# Patient Record
Sex: MALE | Race: WHITE | NOT HISPANIC OR LATINO | Employment: FULL TIME | URBAN - METROPOLITAN AREA
[De-identification: names, ages, dates, MRNs, and addresses within clinical notes are randomized per-mention and may not be internally consistent; named-entity substitution may affect disease eponyms.]

---

## 2021-04-08 DIAGNOSIS — Z23 ENCOUNTER FOR IMMUNIZATION: ICD-10-CM

## 2021-07-19 ENCOUNTER — OFFICE VISIT (OUTPATIENT)
Dept: URGENT CARE | Facility: MEDICAL CENTER | Age: 31
End: 2021-07-19
Payer: COMMERCIAL

## 2021-07-19 VITALS
BODY MASS INDEX: 34.07 KG/M2 | RESPIRATION RATE: 16 BRPM | HEIGHT: 69 IN | WEIGHT: 230 LBS | OXYGEN SATURATION: 98 % | DIASTOLIC BLOOD PRESSURE: 92 MMHG | TEMPERATURE: 99.1 F | HEART RATE: 77 BPM | SYSTOLIC BLOOD PRESSURE: 176 MMHG

## 2021-07-19 DIAGNOSIS — T63.441A BEE STING, ACCIDENTAL OR UNINTENTIONAL, INITIAL ENCOUNTER: Primary | ICD-10-CM

## 2021-07-19 DIAGNOSIS — R03.0 ELEVATED BLOOD PRESSURE READING: ICD-10-CM

## 2021-07-19 PROCEDURE — G0382 LEV 3 HOSP TYPE B ED VISIT: HCPCS | Performed by: PHYSICIAN ASSISTANT

## 2021-07-19 NOTE — PROGRESS NOTES
[facility]        NAME: Kain Davalos is a 31 y o  male  : 1990    MRN: [id number]  DATE: 2021  TIME: 6:17 PM    Assessment and Plan   Bee sting, accidental or unintentional, initial encounter [T63 [id number]]  1  Bee sting, accidental or unintentional, initial encounter  hydrocortisone 2 5 % cream   2  Elevated blood pressure reading           Patient Instructions      apply cream to affected area as directed   Benadryl as directed for itching control  Follow up with PCP in 3-5 days  Proceed to  ER if symptoms worsen  Chief Complaint     Chief Complaint   Patient presents with    Insect Bite     Pt here for bee sting on left flank area  Pt c/o itching at site and redness  Sting occurred yesterday  History of Present Illness        25-year-old male presents with insect sting to the left flank area  Patient reports  [employer] and got stung on his left flank by a bee  Only 1 sting reported  Denies any swelling of lips tongue throat or mouth  No problems with breathing chest pain shortness of breath or wheezing  No nausea vomiting  Denies any fevers or chills  Reports that the areas been very itchy  Has not taking anything for it or applied anything to area  Insect Bite  This is a new problem  The current episode started yesterday  The problem occurs constantly  The problem has been gradually worsening  Associated symptoms include a rash  Pertinent negatives include no change in bowel habit, chills, congestion, coughing, fatigue, fever, headaches, myalgias, nausea, sore throat or vomiting  Nothing aggravates the symptoms  He has tried nothing for the symptoms  The treatment provided no relief  Review of Systems   Review of Systems   Constitutional: Negative  Negative for chills, fatigue and fever  HENT: Negative  Negative for congestion and sore throat  Eyes: Negative  Respiratory: Negative  Negative for cough  Cardiovascular: Negative  Gastrointestinal: Negative  Negative for change in bowel habit, nausea and vomiting  Musculoskeletal: Negative  Negative for myalgias  Skin: Positive for rash  Neurological: Negative  Negative for headaches  Current Medications       Current Outpatient Medications:     hydrocortisone 2 5 % cream, Apply topically 2 (two) times a day, Disp: 30 g, Rfl: 0    Current Allergies     Allergies as of 07/19/2021    (No Known Allergies)            The following portions of the patient's history were reviewed and updated as appropriate: allergies, current medications, past family history, past medical history, past social history, past surgical history and problem list      History reviewed  No pertinent past medical history  Past Surgical History:   Procedure Laterality Date    HERNIA REPAIR      WRIST SURGERY         History reviewed  No pertinent family history  Medications have been verified  Objective   BP (!) 176/92   Pulse 77   Temp 99 1 °F (37 3 °C)   Resp 16   Ht 5' 9" (1 753 m)   Wt 104 kg (230 lb)   SpO2 98%   BMI 33 97 kg/m²   No LMP for male patient  Physical Exam     Physical Exam  Vitals and nursing note reviewed  Constitutional:       General: He is not in acute distress  Appearance: He is well-developed  HENT:      Head: Normocephalic and atraumatic  Right Ear: Hearing, tympanic membrane, ear canal and external ear normal       Left Ear: Hearing, tympanic membrane, ear canal and external ear normal       Nose: Nose normal       Mouth/Throat:      Pharynx: Uvula midline  No oropharyngeal exudate  Eyes:      General:         Right eye: No discharge  Left eye: No discharge  Conjunctiva/sclera: Conjunctivae normal    Cardiovascular:      Rate and Rhythm: Normal rate and regular rhythm  Heart sounds: Normal heart sounds  No murmur heard  Pulmonary:      Effort: Pulmonary effort is normal  No respiratory distress        Breath sounds: Normal breath sounds  No wheezing or rales  Abdominal:      General: Bowel sounds are normal       Palpations: Abdomen is soft  Tenderness: There is no abdominal tenderness  Musculoskeletal:         General: Normal range of motion  Cervical back: Normal range of motion and neck supple  Lymphadenopathy:      Cervical: No cervical adenopathy  Skin:     General: Skin is warm and dry  Findings: Erythema present  Neurological:      Mental Status: He is alert and oriented to person, place, and time

## 2021-07-19 NOTE — PATIENT INSTRUCTIONS
apply cream to affected area as directed   Benadryl as directed for itching control  Follow up with PCP in 3-5 days  Proceed to  ER if symptoms worsen  Insect Bite or Sting   WHAT YOU NEED TO KNOW:   Most insect bites and stings are not dangerous and go away without treatment  Your symptoms may be mild, or you may develop anaphylaxis  Anaphylaxis is a sudden, life-threatening reaction that needs immediate treatment  Common examples of insects that bite or sting are bees, ticks, mosquitoes, spiders, and ants  Insect bites or stings can lead to diseases such as malaria, West Nile virus, Lyme disease, or [place] Spotted Fever  DISCHARGE INSTRUCTIONS:   Call 911 for signs or symptoms of anaphylaxis,  such as trouble breathing, swelling in your mouth or throat, or wheezing  You may also have itching, a rash, hives, or feel like you are going to faint  Return to the emergency department if:   · You are stung on your tongue or in your throat  · A white area forms around the bite  · You are sweating badly or have body pain  · You think you were bitten or stung by a poisonous insect  Contact your healthcare provider if:   · You have a fever  · The area becomes red, warm, tender, and swollen beyond the area of the bite or sting  · You have questions or concerns about your condition or care  Medicines:   · Antihistamines  decrease itching and rash  · Epinephrine  is used to treat severe allergic reactions such as anaphylaxis  · Take your medicine as directed  Contact your healthcare provider if you think your medicine is not helping or if you have side effects  Tell him of her if you are allergic to any medicine  Keep a list of the medicines, vitamins, and herbs you take  Include the amounts, and when and why you take them  Bring the list or the pill bottles to follow-up visits  Carry your medicine list with you in case of an emergency      Steps to take for signs or symptoms of anaphylaxis:   · Immediately  give 1 shot of epinephrine only into the outer thigh muscle  · Leave the shot in place  as directed  Your healthcare provider may recommend you leave it in place for up to 10 seconds before you remove it  This helps make sure all of the epinephrine is delivered  · Call 911 and go to the emergency department,  even if the shot improved symptoms  Do not drive yourself  Bring the used epinephrine shot with you  Safety precautions to take if you are at risk for anaphylaxis:   · Keep 2 shots of epinephrine with you at all times  You may need a second shot, because epinephrine only works for about 20 minutes and symptoms may return  Your healthcare provider can show you and family members how to give the shot  Check the expiration date every month and replace it before it expires  · Create an action plan  Your healthcare provider can help you create a written plan that explains the allergy and an emergency plan to treat a reaction  The plan explains when to give a second epinephrine shot if symptoms return or do not improve after the first  Give copies of the action plan and emergency instructions to family members, work and school staff, and  providers  Show them how to give a shot of epinephrine  · Carry medical alert identification  Wear medical alert jewelry or carry a card that says you have an insect allergy  Ask your healthcare provider where to get these items  If an insect bites or stings you:   · Remove the stinger  Scrape the stinger out with your fingernail, edge of a credit card, or a knife blade  Do not squeeze the wound  Gently wash the area with soap and water  · Remove the tick  Ticks must be removed as soon as possible so you do not get diseases passed through tick bites  Ask your healthcare provider for more information on tick bites and how to remove ticks  Care for a bite or sting wound:   · Elevate the affected area    Prop the wound above the level of your heart, if possible  Elevate the area for 10 to 20 minutes each hour or as directed by your healthcare provider  · Use compresses  Soak a clean washcloth in cold water, wring it out, and put it on the bite or sting  Use the compress for 10 to 20 minutes each hour or as directed by your healthcare provider  After 24 to 48 hours, change to warm compresses  · Apply a paste  Add water to baking soda to make a thick paste  Put the paste on the area for 5 minutes  Rinse gently to remove the paste  Prevent another insect bite or sting:   · Do not wear bright-colored or flower-print clothing when you plan to spend time outdoors  Do not use hairspray, perfumes, or aftershave  · Do not leave food out  · Empty any standing water and wash container with soap and water every 2 days  · Put screens on all open windows and doors  · Put insect repellent that contains DEET on skin that is showing when you go outside  Put insect repellent at the top of your boots, bottom of pant legs, and sleeve cuffs  Wear long sleeves, pants, and shoes  · Use citronella candles outdoors to help keep mosquitoes away  Put a tick and flea collar on pets  Follow up with your healthcare provider as directed:  Write down your questions so you remember to ask them during your visits  © Copyright [address] Information is for End User's use only and may not be sold, redistributed or otherwise used for commercial purposes  All illustrations and images included in CareNotes® are the copyrighted property of [employer] D [employer]  or [address] [place] [address]  The above information is an  only  It is not intended as medical advice for individual conditions or treatments  Talk to your doctor, nurse or pharmacist before following any medical regimen to see if it is safe and effective for you

## 2021-10-06 ENCOUNTER — IMMUNIZATIONS (OUTPATIENT)
Dept: FAMILY MEDICINE CLINIC | Facility: MEDICAL CENTER | Age: 31
End: 2021-10-06

## 2021-10-06 DIAGNOSIS — Z23 ENCOUNTER FOR IMMUNIZATION: Primary | ICD-10-CM

## 2021-10-06 PROCEDURE — 91300 SARSCOV2 VAC 30MCG/0.3ML IM: CPT

## 2025-07-24 ENCOUNTER — OFFICE VISIT (OUTPATIENT)
Dept: URGENT CARE | Facility: MEDICAL CENTER | Age: 35
End: 2025-07-24
Payer: COMMERCIAL

## 2025-07-24 ENCOUNTER — APPOINTMENT (OUTPATIENT)
Dept: RADIOLOGY | Facility: MEDICAL CENTER | Age: 35
End: 2025-07-24
Attending: NURSE PRACTITIONER
Payer: COMMERCIAL

## 2025-07-24 VITALS
DIASTOLIC BLOOD PRESSURE: 83 MMHG | RESPIRATION RATE: 18 BRPM | TEMPERATURE: 98.4 F | SYSTOLIC BLOOD PRESSURE: 157 MMHG | HEART RATE: 97 BPM | OXYGEN SATURATION: 100 %

## 2025-07-24 DIAGNOSIS — M79.622 LEFT UPPER ARM PAIN: ICD-10-CM

## 2025-07-24 DIAGNOSIS — M25.512 ACUTE PAIN OF LEFT SHOULDER: ICD-10-CM

## 2025-07-24 DIAGNOSIS — M25.512 ACUTE PAIN OF LEFT SHOULDER: Primary | ICD-10-CM

## 2025-07-24 PROCEDURE — 73060 X-RAY EXAM OF HUMERUS: CPT

## 2025-07-24 PROCEDURE — 73030 X-RAY EXAM OF SHOULDER: CPT

## 2025-07-24 PROCEDURE — 99203 OFFICE O/P NEW LOW 30 MIN: CPT | Performed by: NURSE PRACTITIONER

## 2025-07-24 RX ORDER — METHOCARBAMOL 500 MG/1
500 TABLET, FILM COATED ORAL 3 TIMES DAILY PRN
Qty: 30 TABLET | Refills: 0 | Status: SHIPPED | OUTPATIENT
Start: 2025-07-24

## 2025-07-24 RX ORDER — PREDNISONE 20 MG/1
40 TABLET ORAL DAILY
Qty: 10 TABLET | Refills: 0 | Status: SHIPPED | OUTPATIENT
Start: 2025-07-24 | End: 2025-07-29

## 2025-07-25 ENCOUNTER — RESULTS FOLLOW-UP (OUTPATIENT)
Dept: URGENT CARE | Facility: MEDICAL CENTER | Age: 35
End: 2025-07-25

## 2025-07-25 ENCOUNTER — TELEPHONE (OUTPATIENT)
Dept: URGENT CARE | Facility: MEDICAL CENTER | Age: 35
End: 2025-07-25

## 2025-07-25 NOTE — TELEPHONE ENCOUNTER
Patient called back and was concerned about having enough medications until he followed up with orthopedics. We did discuss taking Motrin after the steroids are done and following up with Ortho

## 2025-07-25 NOTE — PROGRESS NOTES
[facility]  Name: Naseem Botello      : 1990      MRN: [id number]  Encounter Provider: JIMENA Patel  Encounter Date: 2025   Encounter department: [facility] [place]  :  Assessment & Plan  Acute pain of left shoulder    Orders:  •  XR shoulder 2+ vw left; Future  •  predniSONE 20 mg tablet; Take 2 tablets (40 mg total) by mouth daily for 5 days  •  methocarbamol (ROBAXIN) 500 mg tablet; Take 1 tablet (500 mg total) by mouth 3 (three) times a day as needed for muscle spasms  •  Ambulatory Referral to Orthopedic Surgery; Future    Left upper arm pain    Orders:  •  XR humerus left; Future  •  predniSONE 20 mg tablet; Take 2 tablets (40 mg total) by mouth daily for 5 days  •  methocarbamol (ROBAXIN) 500 mg tablet; Take 1 tablet (500 mg total) by mouth 3 (three) times a day as needed for muscle spasms  •  Ambulatory Referral to Orthopedic Surgery; Future    Patient in NAD and VSS upon exam. Discussed with patient/parent results of xray, no acute fracture noted on preliminary reading. Will start prednisone and Robaxin. Discussed supportive care and return precautions. If no improvement, patient will follow up with Ortho.      Patient Instructions  Follow up with PCP in 3-5 days.  Proceed to  ER if symptoms worsen.    If tests are performed, our office will contact you with results only if changes need to made to the care plan discussed with you at the visit. You can review your full results on [facility].    Chief Complaint:   Chief Complaint   Patient presents with   • Shoulder Pain     Patient c/o left shoulder pain that started 4-5 days ago. He noted wracking hit arm on a tree that he was cutting up.      History of Present Illness   Started: 5 days  Location: left shoulder  Reports  was cutting down trees/shrubs and got hit to left shoulder, bruised and healing  Injury: was at park with kids 5 days ago and stopped ricardo go round, didn't feel immediate  "pain  Denies numbness, tingling, loss of sensation  Treatment: ice, motrin  Patient reports he is feeling anxious regarding his left shoulder pain  Denies CP, SOB, trouble breathing          Review of Systems   Musculoskeletal:  Positive for arthralgias and myalgias. Negative for joint swelling.     Past Medical History   Past Medical History[1]  Past Surgical History[1]  Family History[1]  he   Current Outpatient Medications   Medication Instructions   • methocarbamol (ROBAXIN) 500 mg, Oral, 3 times daily PRN   • predniSONE 40 mg, Oral, Daily   Allergies[1]     Objective   /83   Pulse 97   Temp 98.4 °F (36.9 °C)   Resp 18   SpO2 100%      Physical Exam  Constitutional:       General: He is not in acute distress.     Appearance: Normal appearance. He is not ill-appearing.   HENT:      Head: Normocephalic and atraumatic.      Right Ear: Hearing and external ear normal.      Left Ear: Hearing and external ear normal.      Nose: Nose normal.      Mouth/Throat:      Lips: Pink.      Mouth: Mucous membranes are moist.     Cardiovascular:      Rate and Rhythm: Normal rate.   Pulmonary:      Effort: Pulmonary effort is normal.     Musculoskeletal:         General: Normal range of motion.      Left shoulder: Tenderness present. No swelling or bony tenderness. Normal range of motion. Normal strength. Normal pulse.      Left upper arm: Tenderness present. No swelling.        Arms:      Neurological:      Mental Status: He is alert and oriented to person, place, and time.         Portions of the record may have been created with voice recognition software.  Occasional wrong word or \"sound a like\" substitutions may have occurred due to the inherent limitations of voice recognition software.  Read the chart carefully and recognize, using context, where substitutions have occurred.         [1]  No past medical history on file.[1]  No past surgical history on file.[1]  No family history on file.[1]  No Known Allergies  "

## 2025-07-25 NOTE — TELEPHONE ENCOUNTER
Signs of calcific tendinitis on x-ray.  Provider's note shows no signs of discussion on the initial read.  Called to inform results left voicemail.    Called back same day and discussed results.  Informed him of calcific tendinitis and provided him the orthopedic referral number to call and schedule appointment for next week.  Patient understands and will follow-up as needed.  Denies any further questions.

## 2025-07-30 VITALS — WEIGHT: 244 LBS | HEIGHT: 69 IN | BODY MASS INDEX: 36.14 KG/M2

## 2025-07-30 DIAGNOSIS — M25.512 ACUTE PAIN OF LEFT SHOULDER: Primary | ICD-10-CM

## 2025-07-30 DIAGNOSIS — M79.622 LEFT UPPER ARM PAIN: ICD-10-CM

## 2025-07-30 DIAGNOSIS — S46.912A STRAIN OF LEFT SHOULDER, INITIAL ENCOUNTER: ICD-10-CM

## 2025-07-30 PROCEDURE — 99203 OFFICE O/P NEW LOW 30 MIN: CPT | Performed by: ORTHOPAEDIC SURGERY

## 2025-08-17 ENCOUNTER — OFFICE VISIT (OUTPATIENT)
Dept: URGENT CARE | Facility: MEDICAL CENTER | Age: 35
End: 2025-08-17
Payer: COMMERCIAL

## 2025-08-17 VITALS
SYSTOLIC BLOOD PRESSURE: 127 MMHG | DIASTOLIC BLOOD PRESSURE: 80 MMHG | TEMPERATURE: 98.5 F | OXYGEN SATURATION: 95 % | HEART RATE: 126 BPM | RESPIRATION RATE: 20 BRPM

## 2025-08-17 DIAGNOSIS — S60.469A INSECT BITE OF FINGER WITH LOCAL REACTION, INITIAL ENCOUNTER: Primary | ICD-10-CM
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
## 2025-08-17 PROCEDURE — 99213 OFFICE O/P EST LOW 20 MIN: CPT | Performed by: PHYSICIAN ASSISTANT

## 2025-08-17 PROCEDURE — 96372 THER/PROPH/DIAG INJ SC/IM: CPT | Performed by: PHYSICIAN ASSISTANT

## 2025-08-17 RX ORDER — EPINEPHRINE 1 MG/ML
0.3 INJECTION, SOLUTION, CONCENTRATE INTRAVENOUS ONCE
Status: COMPLETED | OUTPATIENT
Start: 2025-08-17 | End: 2025-08-17

## 2025-08-17 RX ORDER — METHYLPREDNISOLONE ACETATE 40 MG/ML
60 INJECTION, SUSPENSION INTRA-ARTICULAR; INTRALESIONAL; INTRAMUSCULAR; SOFT TISSUE ONCE
Status: COMPLETED | OUTPATIENT
Start: 2025-08-17 | End: 2025-08-17

## 2025-08-17 RX ADMIN — METHYLPREDNISOLONE ACETATE 60 MG: 40 INJECTION, SUSPENSION INTRA-ARTICULAR; INTRALESIONAL; INTRAMUSCULAR; SOFT TISSUE at 15:28

## 2025-08-17 RX ADMIN — EPINEPHRINE 0.3 MG: 1 INJECTION, SOLUTION, CONCENTRATE INTRAVENOUS at 15:29
